# Patient Record
Sex: FEMALE | Race: BLACK OR AFRICAN AMERICAN | NOT HISPANIC OR LATINO | RURAL
[De-identification: names, ages, dates, MRNs, and addresses within clinical notes are randomized per-mention and may not be internally consistent; named-entity substitution may affect disease eponyms.]

---

## 2023-01-15 ENCOUNTER — HOSPITAL ENCOUNTER (EMERGENCY)
Facility: HOSPITAL | Age: 68
Discharge: OTHER INSTITUTION | End: 2023-01-15
Payer: COMMERCIAL

## 2023-01-15 VITALS
RESPIRATION RATE: 22 BRPM | TEMPERATURE: 98 F | BODY MASS INDEX: 28.93 KG/M2 | HEART RATE: 71 BPM | HEIGHT: 66 IN | OXYGEN SATURATION: 96 % | WEIGHT: 180 LBS | DIASTOLIC BLOOD PRESSURE: 105 MMHG | SYSTOLIC BLOOD PRESSURE: 220 MMHG

## 2023-01-15 DIAGNOSIS — N17.9 ACUTE RENAL FAILURE, UNSPECIFIED ACUTE RENAL FAILURE TYPE: ICD-10-CM

## 2023-01-15 DIAGNOSIS — I50.9 ACUTE ON CHRONIC CONGESTIVE HEART FAILURE, UNSPECIFIED HEART FAILURE TYPE: Primary | ICD-10-CM

## 2023-01-15 DIAGNOSIS — R06.00 DYSPNEA: ICD-10-CM

## 2023-01-15 LAB
ANION GAP SERPL CALCULATED.3IONS-SCNC: 21 MMOL/L (ref 7–16)
BASOPHILS # BLD AUTO: 0.02 K/UL (ref 0–0.2)
BASOPHILS NFR BLD AUTO: 0.3 % (ref 0–1)
BUN SERPL-MCNC: 74 MG/DL (ref 7–18)
BUN/CREAT SERPL: 12 (ref 6–20)
CALCIUM SERPL-MCNC: 8.5 MG/DL (ref 8.5–10.1)
CHLORIDE SERPL-SCNC: 103 MMOL/L (ref 98–107)
CO2 SERPL-SCNC: 17 MMOL/L (ref 21–32)
CREAT SERPL-MCNC: 6.03 MG/DL (ref 0.55–1.02)
DIFFERENTIAL METHOD BLD: ABNORMAL
EGFR (NO RACE VARIABLE) (RUSH/TITUS): 7 ML/MIN/1.73M²
EOSINOPHIL # BLD AUTO: 0.14 K/UL (ref 0–0.5)
EOSINOPHIL NFR BLD AUTO: 2 % (ref 1–4)
ERYTHROCYTE [DISTWIDTH] IN BLOOD BY AUTOMATED COUNT: 15.2 % (ref 11.5–14.5)
GLUCOSE SERPL-MCNC: 169 MG/DL (ref 74–106)
HCT VFR BLD AUTO: 26.2 % (ref 38–47)
HGB BLD-MCNC: 8.2 G/DL (ref 12–16)
LYMPHOCYTES # BLD AUTO: 0.89 K/UL (ref 1–4.8)
LYMPHOCYTES NFR BLD AUTO: 12.9 % (ref 27–41)
MAGNESIUM SERPL-MCNC: 1.5 MG/DL (ref 1.7–2.3)
MCH RBC QN AUTO: 24.4 PG (ref 27–31)
MCHC RBC AUTO-ENTMCNC: 31.3 G/DL (ref 32–36)
MCV RBC AUTO: 78 FL (ref 80–96)
MONOCYTES # BLD AUTO: 0.47 K/UL (ref 0–0.8)
MONOCYTES NFR BLD AUTO: 6.8 % (ref 2–6)
MPC BLD CALC-MCNC: 12.4 FL (ref 9.4–12.4)
NEUTROPHILS # BLD AUTO: 5.4 K/UL (ref 1.8–7.7)
NEUTROPHILS NFR BLD AUTO: 78 % (ref 53–65)
NT-PROBNP SERPL-MCNC: ABNORMAL PG/ML (ref 1–125)
PLATELET # BLD AUTO: 229 K/UL (ref 150–400)
POTASSIUM SERPL-SCNC: 4.9 MMOL/L (ref 3.5–5.1)
RBC # BLD AUTO: 3.36 M/UL (ref 4.2–5.4)
SODIUM SERPL-SCNC: 136 MMOL/L (ref 136–145)
TROPONIN I SERPL HS-MCNC: 764.4 PG/ML
WBC # BLD AUTO: 6.92 K/UL (ref 4.5–11)

## 2023-01-15 PROCEDURE — 25000003 PHARM REV CODE 250: Performed by: NURSE PRACTITIONER

## 2023-01-15 PROCEDURE — 99285 EMERGENCY DEPT VISIT HI MDM: CPT | Mod: 25

## 2023-01-15 PROCEDURE — 96376 TX/PRO/DX INJ SAME DRUG ADON: CPT

## 2023-01-15 PROCEDURE — 80048 BASIC METABOLIC PNL TOTAL CA: CPT | Performed by: NURSE PRACTITIONER

## 2023-01-15 PROCEDURE — 83880 ASSAY OF NATRIURETIC PEPTIDE: CPT | Performed by: NURSE PRACTITIONER

## 2023-01-15 PROCEDURE — 96374 THER/PROPH/DIAG INJ IV PUSH: CPT

## 2023-01-15 PROCEDURE — 25000003 PHARM REV CODE 250

## 2023-01-15 PROCEDURE — 36415 COLL VENOUS BLD VENIPUNCTURE: CPT | Performed by: NURSE PRACTITIONER

## 2023-01-15 PROCEDURE — 99285 PR EMERGENCY DEPT VISIT,LEVEL V: ICD-10-PCS | Mod: ,,, | Performed by: NURSE PRACTITIONER

## 2023-01-15 PROCEDURE — 83735 ASSAY OF MAGNESIUM: CPT | Performed by: NURSE PRACTITIONER

## 2023-01-15 PROCEDURE — 84484 ASSAY OF TROPONIN QUANT: CPT | Performed by: NURSE PRACTITIONER

## 2023-01-15 PROCEDURE — 93005 ELECTROCARDIOGRAM TRACING: CPT

## 2023-01-15 PROCEDURE — 96375 TX/PRO/DX INJ NEW DRUG ADDON: CPT

## 2023-01-15 PROCEDURE — 85025 COMPLETE CBC W/AUTO DIFF WBC: CPT | Performed by: NURSE PRACTITIONER

## 2023-01-15 PROCEDURE — 99285 EMERGENCY DEPT VISIT HI MDM: CPT | Mod: ,,, | Performed by: NURSE PRACTITIONER

## 2023-01-15 PROCEDURE — 63600175 PHARM REV CODE 636 W HCPCS: Performed by: NURSE PRACTITIONER

## 2023-01-15 PROCEDURE — 93010 ELECTROCARDIOGRAM REPORT: CPT | Performed by: FAMILY MEDICINE

## 2023-01-15 PROCEDURE — 25000242 PHARM REV CODE 250 ALT 637 W/ HCPCS: Performed by: NURSE PRACTITIONER

## 2023-01-15 RX ORDER — FUROSEMIDE 10 MG/ML
40 INJECTION INTRAMUSCULAR; INTRAVENOUS
Status: COMPLETED | OUTPATIENT
Start: 2023-01-15 | End: 2023-01-15

## 2023-01-15 RX ORDER — NITROGLYCERIN 0.4 MG/1
0.4 TABLET SUBLINGUAL
Status: COMPLETED | OUTPATIENT
Start: 2023-01-15 | End: 2023-01-15

## 2023-01-15 RX ORDER — SODIUM CHLORIDE 9 MG/ML
INJECTION, SOLUTION INTRAVENOUS
Status: DISCONTINUED
Start: 2023-01-15 | End: 2023-01-15 | Stop reason: HOSPADM

## 2023-01-15 RX ORDER — SODIUM CHLORIDE 9 MG/ML
1000 INJECTION, SOLUTION INTRAVENOUS CONTINUOUS
Status: DISCONTINUED | OUTPATIENT
Start: 2023-01-15 | End: 2023-01-15 | Stop reason: HOSPADM

## 2023-01-15 RX ORDER — ASPIRIN 325 MG
325 TABLET ORAL
Status: COMPLETED | OUTPATIENT
Start: 2023-01-15 | End: 2023-01-15

## 2023-01-15 RX ORDER — NITROGLYCERIN 20 MG/100ML
INJECTION INTRAVENOUS
Status: COMPLETED
Start: 2023-01-15 | End: 2023-01-15

## 2023-01-15 RX ORDER — NITROGLYCERIN 20 MG/100ML
5 INJECTION INTRAVENOUS CONTINUOUS
Status: DISCONTINUED | OUTPATIENT
Start: 2023-01-15 | End: 2023-01-15 | Stop reason: HOSPADM

## 2023-01-15 RX ADMIN — NITROGLYCERIN 50 MG: 20 INJECTION INTRAVENOUS at 03:01

## 2023-01-15 RX ADMIN — NITROGLYCERIN 0.4 MG: 0.4 TABLET SUBLINGUAL at 02:01

## 2023-01-15 RX ADMIN — FUROSEMIDE 40 MG: 10 INJECTION, SOLUTION INTRAMUSCULAR; INTRAVENOUS at 01:01

## 2023-01-15 RX ADMIN — SODIUM CHLORIDE 500 ML: 9 INJECTION, SOLUTION INTRAVENOUS at 03:01

## 2023-01-15 RX ADMIN — ASPIRIN 325 MG ORAL TABLET 325 MG: 325 PILL ORAL at 01:01

## 2023-01-15 RX ADMIN — FUROSEMIDE 40 MG: 10 INJECTION, SOLUTION INTRAMUSCULAR; INTRAVENOUS at 02:01

## 2023-01-15 NOTE — ED TRIAGE NOTES
Recd to ED POV with daughter c/o SOB. Resp even but labored. Denies any chest pain or any other c/o. Reports SOB  is more when she is still. AAO x3. Reports taking her daughters valsartan 25mg this morning along with theraflu. Denies having any home medications at this time. Sees Albina Sandoval at [facility] but has not been in quite a while. Lives at home with daughter. Reports her primary took her off of diabetic meds due to her A1C being within range.

## 2023-01-15 NOTE — ED PROVIDER NOTES
Encounter Date: 1/15/2023       History     Chief Complaint   Patient presents with    Shortness of Breath     Increased this morning around 6 AM. Has been going on a couple of days. Denies any other pain or C/O.      History of hypertension and diabetes.  Longstanding history of smoking.  Patient has been without care for over a year and does not take any daily meds.  Presents with dyspnea, progressively worsening over the past few days.  Reports orthopnea for several days progressing to dyspnea at rest today.  Denies chest pain.  PCP and cardiologist in [place].  Cardiologist is Dr. Smith, though again she has not seen either one in quite some time.    Review of patient's allergies indicates:   Allergen Reactions    Amoxicillin     Pcn [penicillins]      Past Medical History:   Diagnosis Date    Acquired left foot drop     Diabetes mellitus     Hypertension      Past Surgical History:   Procedure Laterality Date    CAROTID ENDARTERECTOMY Left      History reviewed. No pertinent family history.  Social History     Tobacco Use    Smoking status: Every Day     Packs/day: 0.50     Years: 57.00     Pack years: 28.50     Types: Cigarettes    Smokeless tobacco: Never   Substance Use Topics    Alcohol use: Not Currently    Drug use: Never     Review of Systems   Constitutional:  Negative for fever.   HENT:  Negative for trouble swallowing.    Eyes:  Negative for visual disturbance.   Respiratory:  Positive for shortness of breath.    Cardiovascular:  Negative for chest pain, palpitations and leg swelling.   Gastrointestinal:  Negative for abdominal pain, diarrhea, nausea and vomiting.   Genitourinary:  Negative for decreased urine volume, dysuria, frequency and hematuria.   Skin:  Negative for color change.   Neurological:  Negative for dizziness, numbness and headaches.     Physical Exam     Initial Vitals [01/15/23 1206]   BP Pulse Resp Temp SpO2   (!) 232/122 99 (!) 32 97.7 °F (36.5 °C) (!) 94 %      MAP       --          Physical Exam    Nursing note and vitals reviewed.  Constitutional: No distress.   HENT:   Head: Normocephalic and atraumatic.   Eyes: EOM are normal. Pupils are equal, round, and reactive to light.   Neck: Neck supple.   Cardiovascular:  Normal rate, regular rhythm and normal heart sounds.           Pulmonary/Chest: No respiratory distress. She has rales (Bibasilar, otherwise clear).   Abdominal: Abdomen is soft. There is no abdominal tenderness.   Musculoskeletal:         General: Edema (bilateral pitting edema) present. Normal range of motion.      Cervical back: Neck supple.     Neurological: She is alert. GCS score is 15. GCS eye subscore is 4. GCS verbal subscore is 5. GCS motor subscore is 6.   Skin: Skin is warm and dry. Capillary refill takes less than 2 seconds.       Medical Screening Exam   See Full Note    ED Course   Procedures  Labs Reviewed   MAGNESIUM - Abnormal; Notable for the following components:       Result Value    Magnesium 1.5 (*)     All other components within normal limits   NT-PRO NATRIURETIC PEPTIDE - Abnormal; Notable for the following components:    ProBNP 109,897 (*)     All other components within normal limits   CBC WITH DIFFERENTIAL - Abnormal; Notable for the following components:    RBC 3.36 (*)     Hemoglobin 8.2 (*)     Hematocrit 26.2 (*)     MCV 78.0 (*)     MCH 24.4 (*)     MCHC 31.3 (*)     RDW 15.2 (*)     Neutrophils % 78.0 (*)     Lymphocytes % 12.9 (*)     Lymphocytes, Absolute 0.89 (*)     Monocytes % 6.8 (*)     All other components within normal limits   TROPONIN I - Abnormal; Notable for the following components:    Troponin I High Sensitivity 764.4 (*)     All other components within normal limits   BASIC METABOLIC PANEL - Abnormal; Notable for the following components:    CO2 17 (*)     Anion Gap 21 (*)     Glucose 169 (*)     BUN 74 (*)     Creatinine 6.03 (*)     eGFR 7 (*)     All other components within normal limits   CBC W/ AUTO DIFFERENTIAL     Narrative:     The following orders were created for panel order CBC auto differential.  Procedure                               Abnormality         Status                     ---------                               -----------         ------                     CBC with Differential[[id number]]        Abnormal            Final result                 Please view results for these tests on the individual orders.        ECG Results              EKG 12-lead (In process)  Result time 01/15/23 12:24:59      In process by Interface, Lab In [facility] (01/15/23 12:24:59)                   Narrative:    Test Reason : R06.00,    Vent. Rate : 105 BPM     Atrial Rate : 105 BPM     P-R Int : 120 ms          QRS Dur : 122 ms      QT Int : 368 ms       P-R-T Axes : 048 -19 117 degrees     QTc Int : 486 ms    Sinus tachycardia  Possible Left atrial enlargement  Left bundle branch block  Abnormal ECG  No previous ECGs available    Referred By:  KASSY           Confirmed By:                                   Imaging Results              X-Ray Chest 1 View (Final result)  Result time 01/15/23 13:23:49      Final result by Raul Torres MD (01/15/23 13:23:49)                   Impression:      Enlarged cardiac silhouette.  Lungs appear clear.      Electronically signed by: Raul Torres  Date:    01/15/2023  Time:    13:23               Narrative:    EXAMINATION:  XR CHEST 1 VIEW    CLINICAL HISTORY:  Dyspnea, unspecified    TECHNIQUE:  Single frontal view of the chest was performed.    COMPARISON:  None    FINDINGS:  The cardiac silhouette is enlarged.  Lungs are clear.  No pneumothorax or large pleural effusion.                                       Medications   nitroGLYCERIN in 5 % dextrose 50 mg/250 mL (200 mcg/mL) infusion (has no administration in time range)   furosemide injection 40 mg (40 mg Intravenous Given 1/15/23 1303)   aspirin tablet 325 mg (325 mg Oral Given 1/15/23 1344)   furosemide injection 40 mg (40 mg Intravenous  Given 1/15/23 1443)   nitroGLYCERIN SL tablet 0.4 mg (0.4 mg Sublingual Given 1/15/23 1446)                 ED Course as of 01/15/23 1510   Sun Andrew 15, 2023   1339 CMP machine not working [GM]   1412 [facility] faxed. Pt wants to be transferred to [facility] [GM]   1434 Video consult with Dr Mercer at [facility]. Advised could give another lasix 40mg IV and SL NTG if needed for BP [GM]   1458 Accepted by Dr Barba at [facility] []      ED Course User Index  [GM] NICO Art          Clinical Impression:   Final diagnoses:  [R06.00] Dyspnea  [I50.9] Acute on chronic congestive heart failure, unspecified heart failure type (Primary)  [N17.9] Acute renal failure, unspecified acute renal failure type        ED Disposition Condition    Transfer to Another Facility Stable                NICO Art  01/15/23 1510

## 2023-05-15 ENCOUNTER — HOSPITAL ENCOUNTER (EMERGENCY)
Facility: HOSPITAL | Age: 68
Discharge: HOME | End: 2023-05-15
Payer: COMMERCIAL

## 2023-05-15 VITALS
HEIGHT: 66 IN | RESPIRATION RATE: 16 BRPM | HEART RATE: 70 BPM | TEMPERATURE: 98 F | OXYGEN SATURATION: 100 % | SYSTOLIC BLOOD PRESSURE: 147 MMHG | DIASTOLIC BLOOD PRESSURE: 59 MMHG | WEIGHT: 168 LBS | BODY MASS INDEX: 27 KG/M2

## 2023-05-15 DIAGNOSIS — Z48.01 ENCOUNTER FOR SURGICAL WOUND DRESSING CHANGE: Primary | ICD-10-CM

## 2023-05-15 PROCEDURE — 99282 EMERGENCY DEPT VISIT SF MDM: CPT

## 2023-05-15 PROCEDURE — 99281 EMR DPT VST MAYX REQ PHY/QHP: CPT

## 2023-05-15 PROCEDURE — 99282 EMERGENCY DEPT VISIT SF MDM: CPT | Performed by: PHYSICIAN ASSISTANT

## 2023-05-15 PROCEDURE — 99283 EMERGENCY DEPT VISIT LOW MDM: CPT

## 2023-05-16 NOTE — ED PROVIDER NOTES
Encounter Date: 5/15/2023       History     Chief Complaint   Patient presents with    permanent port bleeding at site     Patient is a 67-year-old female with history of bleeding from her left arm secondary to having a shunt placed for dialysis earlier today.    She was told if the dressing seeped through to go to the emergency room.      Patient has a past medical history of hypertension, diabetes, foot drop, and CHF.    She is a former smoker with a greater than 28 pack-year history.    Denies any alcohol or drugs    Review of patient's allergies indicates:   Allergen Reactions    Amoxicillin     Pcn [penicillins]      Past Medical History:   Diagnosis Date    Acquired left foot drop     CHF (congestive heart failure)     Diabetes mellitus     Hypertension      Past Surgical History:   Procedure Laterality Date    CAROTID ENDARTERECTOMY Left      History reviewed. No pertinent family history.  Social History     Tobacco Use    Smoking status: Former     Packs/day: 0.50     Years: 57.00     Pack years: 28.50     Types: Cigarettes    Smokeless tobacco: Never   Substance Use Topics    Alcohol use: Not Currently    Drug use: Never     Review of Systems   Musculoskeletal:         Bleeding from the left arm through dressing     Physical Exam     Initial Vitals [05/15/23 2219]   BP Pulse Resp Temp SpO2   (!) 147/59 70 16 98.2 °F (36.8 °C) 100 %      MAP       --         Physical Exam    Constitutional: She appears well-nourished. No distress.   HENT:   Head: Atraumatic.   Eyes: EOM are normal.   Neck: Neck supple.   Cardiovascular:  Normal rate and regular rhythm.           Pulmonary/Chest: No respiratory distress.   Musculoskeletal:      Cervical back: Neck supple.      Comments: Left arm, exterior dressing was removed, slow oozing from surgical site.    No active bleeding.    Dressing was reinforced with gauze, Kerlix, and Ace wrap.         Neurological: She is alert and oriented to person, place, and time.   Skin:  Skin is dry.   Psychiatric: She has a normal mood and affect.       Medical Screening Exam   See Full Note    ED Course   Procedures  Labs Reviewed - No data to display       Imaging Results    None          Medications - No data to display  Medical Decision Making:   Initial Assessment:   Patient is a 67-year-old female with history of bleeding from her left arm secondary to having a shunt placed for dialysis earlier today.    She was told if the dressing seeped through to go to the emergency room.      Patient has a past medical history of hypertension, diabetes, foot drop, and CHF.    She is a former smoker with a greater than 28 pack-year history.    Denies any alcohol or drugs  Differential Diagnosis:   Slow oozing through dressing  ED Management:  Dressing was reinforced, patient will follow-up with nephrologist in the morning.    She was given extra dressing to reinforce if oozing continues.    If significant bleeding occurs, she will return to the emergency department.                       Clinical Impression:   Final diagnoses:  [Z48.01] Encounter for surgical wound dressing change (Primary)        ED Disposition Condition    Discharge Stable          ED Prescriptions    None       Follow-up Information    None          KEMAL Gonzalez  05/15/23 [id number]

## 2023-05-16 NOTE — DISCHARGE INSTRUCTIONS
Return to the ER if any new or worsening symptoms arise.  Follow-up with your nephrologist in the morning

## 2023-05-16 NOTE — ED TRIAGE NOTES
Patient had permanent dialysis port placed today in [place].  Patient was told that if it started bleeding heavily to come to nearest hospital.  Patient has some bright red blood at site but bleeding looks controlled.  Rates pain at site at 5.  States she took a pain pill before laying down.  Patient has recent diagnosis of CHF in January.  Patient has been on dialysis since January.  Patient goes to dialysis in [place] on Tu, Th, Sat.

## 2023-05-24 NOTE — ADDENDUM NOTE
Encounter addended by: Shea Parham on: 5/24/2023 10:41 AM   Actions taken: Charge Capture section accepted

## 2023-05-25 NOTE — ADDENDUM NOTE
Encounter addended by: Shea Parham on: 5/25/2023 8:13 AM   Actions taken: Charge Capture section accepted

## 2023-05-29 NOTE — ADDENDUM NOTE
Encounter addended by: Madhuri David RN on: 5/29/2023 9:24 AM   Actions taken: Contacts section saved